# Patient Record
Sex: MALE | Race: WHITE | NOT HISPANIC OR LATINO | URBAN - METROPOLITAN AREA
[De-identification: names, ages, dates, MRNs, and addresses within clinical notes are randomized per-mention and may not be internally consistent; named-entity substitution may affect disease eponyms.]

---

## 2024-01-25 ENCOUNTER — APPOINTMENT (RX ONLY)
Dept: URBAN - METROPOLITAN AREA CLINIC 141 | Facility: CLINIC | Age: 24
Setting detail: DERMATOLOGY
End: 2024-01-25

## 2024-01-25 DIAGNOSIS — Z41.9 ENCOUNTER FOR PROCEDURE FOR PURPOSES OTHER THAN REMEDYING HEALTH STATE, UNSPECIFIED: ICD-10-CM

## 2024-01-25 PROCEDURE — ? COSMETIC CONSULTATION: IPL

## 2024-01-29 ENCOUNTER — APPOINTMENT (RX ONLY)
Dept: URBAN - METROPOLITAN AREA CLINIC 141 | Facility: CLINIC | Age: 24
Setting detail: DERMATOLOGY
End: 2024-01-29

## 2024-01-29 DIAGNOSIS — Z41.9 ENCOUNTER FOR PROCEDURE FOR PURPOSES OTHER THAN REMEDYING HEALTH STATE, UNSPECIFIED: ICD-10-CM

## 2024-01-29 PROCEDURE — ? VENUS VERSA IPL

## 2024-01-29 ASSESSMENT — LOCATION ZONE DERM: LOCATION ZONE: FACE

## 2024-01-29 ASSESSMENT — LOCATION DETAILED DESCRIPTION DERM: LOCATION DETAILED: LEFT INFERIOR MEDIAL MALAR CHEEK

## 2024-01-29 ASSESSMENT — LOCATION SIMPLE DESCRIPTION DERM: LOCATION SIMPLE: LEFT CHEEK

## 2024-01-29 NOTE — PROCEDURE: VENUS VERSA IPL
Detail Level: Zone
Pulse Mode: single
Pulse Delay (In Milliseconds): 0
Treated Area: small area
Treatment Number: 1
Skin Type (Optional): II
Fluence Units: J/cm2
Post-Care Instructions: IPL to face, excluding follicular area.  Reducing rosacea symptoms.\\n 515 handpiece.  Performed test pulse, waited 5 mins.  No adverse response. \\n\\nOne pass 16j/15ms 60% cooling to face.  Observed little to no erythema and no edema.   Applied spf and post tx.  Reviewed post-care instructions.  \\n\\nAdvised pt to protect treated area with clothing and and avoid sun exposure.  Discussed importance of daily spf application and sun avoidance.  Advised not to scratch or scrub brown spots when they begin to lift and slough to the surface.  Pt understands multiple tx may be necessary and maintenance for desired outcome. \\n\\nPt will return 4wks subsequent.
Pulse Duration (In Milliseconds): 15
Frequency: 1 Hz
Applicator: [facility]
Fluence: 16
Consent: Written consent obtained, risks reviewed including but not limited to crusting, scabbing, blistering, scarring, darker or lighter pigmentary change, bruising, and/or incomplete response.
Cooling: 60

## 2024-03-07 ENCOUNTER — APPOINTMENT (RX ONLY)
Dept: URBAN - METROPOLITAN AREA CLINIC 141 | Facility: CLINIC | Age: 24
Setting detail: DERMATOLOGY
End: 2024-03-07

## 2024-03-07 DIAGNOSIS — Z41.9 ENCOUNTER FOR PROCEDURE FOR PURPOSES OTHER THAN REMEDYING HEALTH STATE, UNSPECIFIED: ICD-10-CM

## 2024-03-07 PROCEDURE — ? VENUS VERSA IPL

## 2024-03-07 ASSESSMENT — LOCATION DETAILED DESCRIPTION DERM: LOCATION DETAILED: LEFT SUPERIOR CENTRAL MALAR CHEEK

## 2024-03-07 ASSESSMENT — LOCATION ZONE DERM: LOCATION ZONE: FACE

## 2024-03-07 ASSESSMENT — LOCATION SIMPLE DESCRIPTION DERM: LOCATION SIMPLE: LEFT CHEEK

## 2024-03-07 NOTE — PROCEDURE: VENUS VERSA IPL
Post-Care Instructions: IPL to full face - avoiding follicular area.  580 handpiece.  Performed test pulse, waited 5 mins.  No adverse response.  Pt reports not problems from prev tx.  Pt reports reduction of redness on forehead, seeking additional reduction of redness on cheeks and nose.\\n\\nTwo pass - 16j/15ms 60% cooling to face. Observed little to no erythema, no edema.   Applied spf and post tx.  Reviewed post-care instructions.   \\n\\nAdvised pt to protect treated area with clothing and and avoid sun exposure.  Discussed importance of daily spf application and sun avoidance.  Advised not to scratch or scrub brown spots when they begin to lift and slough to the surface.  Pt understands multiple tx may be necessary and maintenance for desired outcome. \\n\\nPt will return 4wks for follow up/subsequent ipl.
Applicator:  [id number]
Treated Area: small area
Frequency: 1 Hz
Cooling: 60
Number Of Passes: 1
Pulse Duration (In Milliseconds): 15
Fluence Units: J/cm2
Pulse Delay (In Milliseconds): 0
Pulse Mode: single
Fluence: 16
Detail Level: Zone
Skin Type (Optional): III
Consent: Written consent obtained, risks reviewed including but not limited to crusting, scabbing, blistering, scarring, darker or lighter pigmentary change, bruising, and/or incomplete response.
Treatment Number: 2

## 2024-05-06 ENCOUNTER — APPOINTMENT (RX ONLY)
Dept: URBAN - METROPOLITAN AREA CLINIC 141 | Facility: CLINIC | Age: 24
Setting detail: DERMATOLOGY
End: 2024-05-06

## 2024-05-06 DIAGNOSIS — Z41.9 ENCOUNTER FOR PROCEDURE FOR PURPOSES OTHER THAN REMEDYING HEALTH STATE, UNSPECIFIED: ICD-10-CM

## 2024-05-06 PROCEDURE — ? VENUS VERSA IPL

## 2024-05-06 PROCEDURE — ? COSMETIC CONSULTATION: PRODUCTS

## 2024-05-06 ASSESSMENT — LOCATION SIMPLE DESCRIPTION DERM: LOCATION SIMPLE: LEFT CHEEK

## 2024-05-06 ASSESSMENT — LOCATION ZONE DERM: LOCATION ZONE: FACE

## 2024-05-06 ASSESSMENT — LOCATION DETAILED DESCRIPTION DERM: LOCATION DETAILED: LEFT CENTRAL MALAR CHEEK

## 2024-05-06 NOTE — PROCEDURE: VENUS VERSA IPL
Anesthesia Volume In Cc: 0
Consent: Written consent obtained, risks reviewed including but not limited to crusting, scabbing, blistering, scarring, darker or lighter pigmentary change, bruising, and/or incomplete response.
Fluence Units: J/cm2
Treatment Number: 3
Number Of Passes: 1
Applicator:  [id number]
Post-Care Instructions: 580 handpiece.  Performed test pulse, waited 5 mins.  No adverse response.  \\n\\nTwo pass avoiding follicular area- 14j/20ms 60% cooling to face.  Observed little to no erythema, no edema.   Observed reduction of telangiectasia.  Reviewed post-care instructions.   \\n\\nAdvised pt to protect treated area with clothing and and avoid sun exposure.  Discussed importance of daily spf application and sun avoidance.  Advised not to scratch or scrub brown spots when they begin to lift and slough to the surface.  Pt understands multiple tx may be necessary and maintenance for desired outcome.
Skin Type (Optional): III
Pulse Duration (In Milliseconds): 20
Detail Level: Zone
Cooling: 60
Frequency: 1 Hz
Fluence: 14
Treated Area: small area
Pulse Mode: single